# Patient Record
Sex: FEMALE | Race: OTHER | HISPANIC OR LATINO | URBAN - METROPOLITAN AREA
[De-identification: names, ages, dates, MRNs, and addresses within clinical notes are randomized per-mention and may not be internally consistent; named-entity substitution may affect disease eponyms.]

---

## 2021-10-26 ENCOUNTER — OFFICE VISIT (OUTPATIENT)
Dept: URBAN - METROPOLITAN AREA CLINIC 63 | Facility: CLINIC | Age: 51
End: 2021-10-26

## 2021-11-23 ENCOUNTER — OFFICE VISIT (OUTPATIENT)
Dept: URBAN - METROPOLITAN AREA SURGERY CENTER 4 | Facility: SURGERY CENTER | Age: 51
End: 2021-11-23

## 2021-12-07 ENCOUNTER — OFFICE VISIT (OUTPATIENT)
Dept: URBAN - METROPOLITAN AREA CLINIC 63 | Facility: CLINIC | Age: 51
End: 2021-12-07

## 2021-12-17 ENCOUNTER — OFFICE VISIT (OUTPATIENT)
Dept: URBAN - METROPOLITAN AREA SURGERY CENTER 4 | Facility: SURGERY CENTER | Age: 51
End: 2021-12-17

## 2021-12-23 LAB — PATHOLOGY (INDENTED REPORT): (no result)

## 2022-01-04 ENCOUNTER — OFFICE VISIT (OUTPATIENT)
Dept: URBAN - METROPOLITAN AREA CLINIC 63 | Facility: CLINIC | Age: 52
End: 2022-01-04

## 2022-01-07 ENCOUNTER — OFFICE VISIT (OUTPATIENT)
Dept: URBAN - METROPOLITAN AREA SURGERY CENTER 4 | Facility: SURGERY CENTER | Age: 52
End: 2022-01-07

## 2022-01-18 ENCOUNTER — OFFICE VISIT (OUTPATIENT)
Dept: URBAN - METROPOLITAN AREA CLINIC 63 | Facility: CLINIC | Age: 52
End: 2022-01-18

## 2022-03-01 ENCOUNTER — OFFICE VISIT (OUTPATIENT)
Dept: URBAN - METROPOLITAN AREA CLINIC 63 | Facility: CLINIC | Age: 52
End: 2022-03-01

## 2022-07-09 ENCOUNTER — TELEPHONE ENCOUNTER (OUTPATIENT)
Dept: URBAN - METROPOLITAN AREA CLINIC 121 | Facility: CLINIC | Age: 52
End: 2022-07-09

## 2022-07-09 RX ORDER — MV-MIN/FOLIC/VIT K/LYCOP/COQ10 200-100MCG
CAPSULE ORAL
Refills: 0 | OUTPATIENT
Start: 2021-10-26 | End: 2021-10-26

## 2022-07-09 RX ORDER — FAMOTIDINE 10 MG
TABLET ORAL TWICE A DAY
Refills: 0 | OUTPATIENT
Start: 2019-05-01 | End: 2021-10-26

## 2022-07-09 RX ORDER — SUCRALFATE 1 G/1
TABLET ORAL
Refills: 0 | OUTPATIENT
Start: 2021-10-26 | End: 2021-10-26

## 2022-07-09 RX ORDER — FAMOTIDINE 10 MG
TABLET ORAL TWICE A DAY
Refills: 0 | OUTPATIENT
Start: 2021-10-26 | End: 2021-10-26

## 2022-07-09 RX ORDER — MV-MIN/FOLIC/VIT K/LYCOP/COQ10 200-100MCG
CAPSULE ORAL
Refills: 0 | OUTPATIENT
Start: 2019-05-01 | End: 2021-10-26

## 2022-07-09 RX ORDER — FAMOTIDINE 40 MG/1
1 EVERY BEDTIME TABLET, FILM COATED ORAL
Refills: 2 | OUTPATIENT
Start: 2021-10-26 | End: 2022-01-18

## 2022-07-09 RX ORDER — OMEPRAZOLE 40 MG/1
CAPSULE, DELAYED RELEASE ORAL ONCE A DAY
Refills: 0 | OUTPATIENT
Start: 2021-10-26 | End: 2022-01-18

## 2022-07-09 RX ORDER — SUCRALFATE 1 G/1
TABLET ORAL
Refills: 0 | OUTPATIENT
Start: 2019-05-01 | End: 2021-10-26

## 2022-07-10 ENCOUNTER — TELEPHONE ENCOUNTER (OUTPATIENT)
Dept: URBAN - METROPOLITAN AREA CLINIC 121 | Facility: CLINIC | Age: 52
End: 2022-07-10

## 2022-07-10 RX ORDER — SUCRALFATE 1 G/10ML
FOUR TIMES A DAY 2 TEASPOONS QID SUSPENSION ORAL
Refills: 1 | Status: ACTIVE | COMMUNITY
Start: 2019-05-01

## 2022-07-10 RX ORDER — FAMOTIDINE 40 MG/1
1 EVERY BEDTIME TABLET, FILM COATED ORAL
Refills: 1 | Status: ACTIVE | COMMUNITY
Start: 2022-01-18

## 2022-07-10 RX ORDER — ONDANSETRON HYDROCHLORIDE 4 MG/1
USE AS DIRECTED TAKE ONE TABLET 30 MINUTES BEFORE EACH BOTTLE OF MAGNESIUM CITRATE FOR COLONOSCOPY PREP TABLET, FILM COATED ORAL
Refills: 0 | Status: ACTIVE | COMMUNITY
Start: 2021-10-26

## 2022-07-10 RX ORDER — ANASTROZOLE 1 MG/1
TABLET ORAL ONCE A DAY
Refills: 0 | Status: ACTIVE | COMMUNITY
Start: 2022-01-18

## 2022-07-10 RX ORDER — SUCRALFATE 1 G/1
FOUR TIMES A DAY; TAKE ON EMPTY STOMACH 1 HR BEFORE OR 2 HOURS AFTER MEALS/OTHER MEDICATION TABLET ORAL
Refills: 1 | Status: ACTIVE | COMMUNITY
Start: 2022-01-18

## 2022-07-10 RX ORDER — SUCRALFATE 1 G/1
FOUR TIMES A DAY; TAKE ON EMPTY STOMACH 1 HR BEFORE OR 2 HOURS AFTER MEALS/OTHER MEDICATION TABLET ORAL
Refills: 1 | Status: ACTIVE | COMMUNITY
Start: 2019-05-02

## 2022-07-10 RX ORDER — OMEPRAZOLE 40 MG/1
TAKE ONE CAPSULE BY MOUTH ONCE A DAY CAPSULE, DELAYED RELEASE ORAL ONCE A DAY
Refills: 1 | Status: ACTIVE | COMMUNITY
Start: 2022-01-18

## 2022-07-10 RX ORDER — OMEPRAZOLE 40 MG/1
TWICE A DAY CAPSULE, DELAYED RELEASE ORAL TWICE A DAY
Refills: 1 | Status: ACTIVE | COMMUNITY
Start: 2019-05-01

## 2022-08-10 ENCOUNTER — WEB ENCOUNTER (OUTPATIENT)
Dept: URBAN - METROPOLITAN AREA CLINIC 63 | Facility: CLINIC | Age: 52
End: 2022-08-10

## 2022-08-10 ENCOUNTER — OFFICE VISIT (OUTPATIENT)
Dept: URBAN - METROPOLITAN AREA CLINIC 63 | Facility: CLINIC | Age: 52
End: 2022-08-10
Payer: SELF-PAY

## 2022-08-10 ENCOUNTER — DASHBOARD ENCOUNTERS (OUTPATIENT)
Age: 52
End: 2022-08-10

## 2022-08-10 VITALS
DIASTOLIC BLOOD PRESSURE: 72 MMHG | SYSTOLIC BLOOD PRESSURE: 120 MMHG | TEMPERATURE: 97.9 F | WEIGHT: 152 LBS | OXYGEN SATURATION: 98 % | HEART RATE: 70 BPM | BODY MASS INDEX: 23.86 KG/M2 | HEIGHT: 67 IN

## 2022-08-10 DIAGNOSIS — K21.9 GASTROESOPHAGEAL REFLUX DISEASE WITHOUT ESOPHAGITIS: ICD-10-CM

## 2022-08-10 PROBLEM — 266435005: Status: ACTIVE | Noted: 2022-08-10

## 2022-08-10 PROCEDURE — 99213 OFFICE O/P EST LOW 20 MIN: CPT | Performed by: PHYSICIAN ASSISTANT

## 2022-08-10 RX ORDER — ONDANSETRON HYDROCHLORIDE 4 MG/1
USE AS DIRECTED TAKE ONE TABLET 30 MINUTES BEFORE EACH BOTTLE OF MAGNESIUM CITRATE FOR COLONOSCOPY PREP TABLET, FILM COATED ORAL
Refills: 0 | Status: ON HOLD | COMMUNITY
Start: 2021-10-26

## 2022-08-10 RX ORDER — SUCRALFATE 1 G/10ML
FOUR TIMES A DAY 2 TEASPOONS QID SUSPENSION ORAL
Refills: 1 | Status: ON HOLD | COMMUNITY
Start: 2019-05-01

## 2022-08-10 RX ORDER — FAMOTIDINE 40 MG/1
1 EVERY BEDTIME TABLET, FILM COATED ORAL
Qty: 30 TABLET | Refills: 1

## 2022-08-10 RX ORDER — SUCRALFATE 1 G/1
FOUR TIMES A DAY; TAKE ON EMPTY STOMACH 1 HR BEFORE OR 2 HOURS AFTER MEALS/OTHER MEDICATION TABLET ORAL
Refills: 1 | Status: ON HOLD | COMMUNITY
Start: 2022-01-18

## 2022-08-10 RX ORDER — OMEPRAZOLE 40 MG/1
TAKE ONE CAPSULE 30 MINS BEFORE BREAKFAST CAPSULE, DELAYED RELEASE ORAL ONCE A DAY
Qty: 30 CAPSULE | Refills: 1

## 2022-08-10 RX ORDER — ANASTROZOLE 1 MG/1
TABLET ORAL ONCE A DAY
Refills: 0 | Status: ACTIVE | COMMUNITY
Start: 2022-01-18

## 2022-08-10 RX ORDER — FAMOTIDINE 40 MG/1
1 EVERY BEDTIME TABLET, FILM COATED ORAL
Refills: 1 | Status: ON HOLD | COMMUNITY
Start: 2022-01-18

## 2022-08-10 RX ORDER — OMEPRAZOLE 40 MG/1
TAKE ONE CAPSULE BY MOUTH ONCE A DAY CAPSULE, DELAYED RELEASE ORAL ONCE A DAY
Refills: 1 | Status: ON HOLD | COMMUNITY
Start: 2022-01-18

## 2022-08-10 NOTE — HPI-TODAY'S VISIT:
Torie is a pleasant 51 year old female who presents today for follow up.     MRI/MRCP dated 11/4/2021 demonstrated no acute abnormality in the abdomen.  Mild biliary dilatation is stable since May 2019.  The gallbladder contains layering sludge versus concentrated bile and no definitive gallstones    EGD dated 12/17/2021 showed a regular Z-line.  Dilation was performed with a Alaniz dilator with mild resistance at 52 [place].  Gastritis.  Normal duodenum. Benign duodenal mucosa.  Negative for H. pylori.  Benign lower third and upper third esophageal biopsies    Patient was referred to general surgery for possible cholecystectomy and IOC    Colonoscopy dated 1/7/2022 demonstrated nonbleeding internal hemorrhoids.  No specimens collected.  Repeat colonoscopy in 10 years per protocol    Labs dated 6/28/2022 showed a normal CBC.  Normal renal function.  Normal LFTs.  CEA normal.  CA 27.29 normal.  TSH normal.  HCV antibody negative.   Aspiration study in July 2021 showed no evidence of aspiration.    Barium swallow in July 2021 showed normal single contrast barium esophagram    Videofluoroscopic swallow study in July 2021 showed oral assessment within functional limits.  Minimal pharyngeal dysphagia.  Recommended regular thin diet, staying 90 degrees upright, effortful swallow, eating slowly and multiple dry swallows.  Form small bolus.  No dysphagia therapy indicated.  Cough is absent    Never went to go see surgeon. Noting worsening odynophagia and constant clearing of her throat. Worse in the morning. No longer on PPI or Pepcid. Notes 1-2 daily bowel movements without constipation or diarrhea. Denies any nausea, vomiting, dysphagia, hematemesis, coffee ground emesis, abdominal pain, change in bowel habits, abnormal weight loss, BRBPR or melena. Denies any family history of colon cancer or colon polyps. Notes no other GI complaints at this time

## 2022-09-21 ENCOUNTER — OFFICE VISIT (OUTPATIENT)
Dept: URBAN - METROPOLITAN AREA CLINIC 63 | Facility: CLINIC | Age: 52
End: 2022-09-21

## 2022-10-04 ENCOUNTER — OFFICE VISIT (OUTPATIENT)
Dept: URBAN - METROPOLITAN AREA CLINIC 63 | Facility: CLINIC | Age: 52
End: 2022-10-04

## 2024-10-21 ENCOUNTER — TELEPHONE ENCOUNTER (OUTPATIENT)
Dept: URBAN - METROPOLITAN AREA CLINIC 23 | Facility: CLINIC | Age: 54
End: 2024-10-21